# Patient Record
Sex: MALE | Race: WHITE | NOT HISPANIC OR LATINO | URBAN - NONMETROPOLITAN AREA
[De-identification: names, ages, dates, MRNs, and addresses within clinical notes are randomized per-mention and may not be internally consistent; named-entity substitution may affect disease eponyms.]

---

## 2017-10-09 ENCOUNTER — APPOINTMENT (RX ONLY)
Dept: URBAN - NONMETROPOLITAN AREA CLINIC 22 | Facility: CLINIC | Age: 82
Setting detail: DERMATOLOGY
End: 2017-10-09

## 2017-10-09 VITALS — HEIGHT: 70 IN | WEIGHT: 215 LBS

## 2017-10-09 DIAGNOSIS — L40.0 PSORIASIS VULGARIS: ICD-10-CM

## 2017-10-09 PROBLEM — E78.5 HYPERLIPIDEMIA, UNSPECIFIED: Status: ACTIVE | Noted: 2017-10-09

## 2017-10-09 PROBLEM — L85.3 XEROSIS CUTIS: Status: ACTIVE | Noted: 2017-10-09

## 2017-10-09 PROCEDURE — ? TREATMENT REGIMEN

## 2017-10-09 PROCEDURE — 99202 OFFICE O/P NEW SF 15 MIN: CPT

## 2017-10-09 PROCEDURE — ? COUNSELING

## 2017-10-09 PROCEDURE — ? ORDER TESTS

## 2017-10-09 ASSESSMENT — LOCATION SIMPLE DESCRIPTION DERM
LOCATION SIMPLE: RIGHT FOREARM
LOCATION SIMPLE: LEFT FOREARM

## 2017-10-09 ASSESSMENT — LOCATION DETAILED DESCRIPTION DERM
LOCATION DETAILED: LEFT PROXIMAL DORSAL FOREARM
LOCATION DETAILED: RIGHT PROXIMAL DORSAL FOREARM

## 2017-10-09 ASSESSMENT — LOCATION ZONE DERM: LOCATION ZONE: ARM

## 2017-10-09 NOTE — PROCEDURE: TREATMENT REGIMEN
Other Instructions: The Patient states he has been on Methotrexate for many years, he has had liver biopsies done. The most recent one was done by Dr. García/[facility].  Will call for records. Plan: The Patient states he has been on Methotrexate for many years, he has had liver biopsies done. The most recent one was done by Dr. García/[facility].  Will call for records.

## 2017-10-09 NOTE — PROCEDURE: TREATMENT REGIMEN
Continue Regimen: Triamcinalone cream: Apply BID X2 weeks, take 1 week off, Methotrexate 2.5, 6 weekly (Tuesday), folic acid 1 mg days not taking Methotrexate,

## 2017-10-26 ENCOUNTER — APPOINTMENT (RX ONLY)
Dept: URBAN - NONMETROPOLITAN AREA CLINIC 22 | Facility: CLINIC | Age: 82
Setting detail: DERMATOLOGY
End: 2017-10-26

## 2017-10-26 DIAGNOSIS — Z79.899 OTHER LONG TERM (CURRENT) DRUG THERAPY: ICD-10-CM

## 2017-10-26 PROCEDURE — ? ORDER TESTS

## 2017-10-26 PROCEDURE — ? COUNSELING

## 2017-12-11 ENCOUNTER — APPOINTMENT (RX ONLY)
Dept: URBAN - NONMETROPOLITAN AREA CLINIC 22 | Facility: CLINIC | Age: 82
Setting detail: DERMATOLOGY
End: 2017-12-11

## 2017-12-11 DIAGNOSIS — L40.0 PSORIASIS VULGARIS: ICD-10-CM

## 2017-12-11 PROCEDURE — 99212 OFFICE O/P EST SF 10 MIN: CPT

## 2017-12-11 PROCEDURE — ? TREATMENT REGIMEN

## 2017-12-11 PROCEDURE — ? COUNSELING

## 2017-12-11 ASSESSMENT — LOCATION DETAILED DESCRIPTION DERM
LOCATION DETAILED: LEFT PROXIMAL DORSAL FOREARM
LOCATION DETAILED: RIGHT PROXIMAL DORSAL FOREARM

## 2017-12-11 ASSESSMENT — LOCATION SIMPLE DESCRIPTION DERM
LOCATION SIMPLE: LEFT FOREARM
LOCATION SIMPLE: RIGHT FOREARM

## 2017-12-11 ASSESSMENT — LOCATION ZONE DERM: LOCATION ZONE: ARM

## 2017-12-11 NOTE — PROCEDURE: TREATMENT REGIMEN
Other Instructions: Discussed recommendation to switch from MTX to Otezla due to liver bx showing liver changes which may be due to long term use of MTX. \\nPatient recently started taking medication for depression (Lexapro).  Patient and wife state that he has been feeling tired and depressed lately.  Denies any history of suicidal ideation.  Patient leaving for [place] on 1-9-18.  Will discuss with patient on phone in 1 month.  If he feels that his mood has stabilized, will start the Otezla at that time.  Advised patient and wife to call office if depression worsens when on Otezla. Plan: Discussed recommendation to switch from MTX to Otezla due to liver bx showing liver changes which may be due to long term use of MTX. \\nPatient recently started taking medication for depression (Lexapro).  Patient and wife state that he has been feeling tired and depressed lately.  Denies any history of suicidal ideation.  Patient leaving for [place] on 1-9-18.  Will discuss with patient on phone in 1 month.  If he feels that his mood has stabilized, will start the Otezla at that time.  Advised patient and wife to call office if depression worsens when on Otezla.

## 2017-12-11 NOTE — PROCEDURE: TREATMENT REGIMEN
Continue Regimen: Triamcinalone cream: Apply BID X2 weeks, take 1 week off, \\ncontinue current dose of MTX x 1 more month